# Patient Record
Sex: FEMALE | Race: WHITE
[De-identification: names, ages, dates, MRNs, and addresses within clinical notes are randomized per-mention and may not be internally consistent; named-entity substitution may affect disease eponyms.]

---

## 2021-11-10 NOTE — NUR
DISCHARGE INSTRUCTIONS GIVEN TO PATIENT. PATIENT C/O HEADACHE. MEDICATED FOR
HEADACHE WITH IBUPROFEN. LUNCH HERE. CALL LIGHT WITHIN REACH.

## 2021-11-10 NOTE — NUR
PT RESTING IN BED CALL LIGHT WITHIN REACH. I LET HER KNOW IT SHOULD NOT BE
MUCH LONGER BEFORE CRNA COMES TO TALK TO HER.

## 2021-11-10 NOTE — NUR
1300: VS CHECKED. PATIENT TOLERATED LUNCH. ASSISTED PATIENT OOB AND TO
BATHROOM. GAIT STEADY. VOID WITHOUT DIFFICULTY. GAIT STEADY BACK TO ROOM. IV
DC'DC WNL. TIP INTACT. DRESSING APPLIED. PATIENT GETTING DRESSED.
1320: PATIENT DISCHARGED TO HOME VIA WHEELCHAIR WITH FRIEND.

## 2021-11-10 NOTE — OR
[facility]
                                    [address] [place], [place]  [address]
_________________________________________________________________________________________
                                                                 Signed   
 
 
DATE OF OPERATION:
11/10/2021
 
SURGEON:
Sandra Garcia MD
 
PREOPERATIVE DIAGNOSES:
Endometrial polyp, cervical polyp.
 
POSTOPERATIVE DIAGNOSES:
Endometrial polyp, cervical polyp, pending pathology.
 
PROCEDURE:
Hysteroscopy, resection of polyps, removal of cervical polyp.
 
ANESTHESIA:
General, MAC.
 
ESTIMATED BLOOD LOSS:
Minimal.
 
DRAINS:
None.
 
INDICATIONS AND FINDINGS:
The patient is a 76-year-old female, who has developed hirsutism over the last several
months with elevated serum androgens, but during the course of her evaluation, was
also found to have a very thickened endometrium.  Endometrial biopsy in the office was
benign other than showing an endometrial polyp.  She now is being evaluated for this.
At the time of surgery, her exam did reveal a slightly enlarged clitoris.  There was
excessive hair growth on the midline of the abdomen.  The uterus itself was top-normal
size.  It sounded to 9 cm.  There were multiple polyps within the uterus.  There was
also a small cervical polyp. 
 
DESCRIPTION OF PROCEDURE:
The patient was prepped and draped in the dorsal lithotomy position.  A weighted
speculum was placed.  The anterior lip of the cervix was visualized and grasped with a
single-tooth tenaculum.  The cavity was sounded to 9 cm.  The endocervical canal was
then dilated to a #10 dilator.  The MyoSure device was placed.  The cavity was evaluated
and it was felt that the MyoSure Lite would be helpful.  This was introduced and the
multiple polyps were excised.  Following this procedure, the cervical polyp was removed
with biopsy forceps.  There was no evidence of any ongoing bleeding.  The tenaculum was
 
    Electronically Signed By: SANDRA GARCIA MD  11/10/21 2109
_________________________________________________________________________________________
PATIENT NAME:     MARIKA GAIXOLA                        
MEDICAL RECORD #: [id number]            OPERATIVE REPORT              
          ACCT #: [id number]  
DATE OF BIRTH:   [birth year]            REPORT #: [id number]      
PHYSICIAN:        SANDRA GARCIA MD            
PCP:              SHERLY THIBODEAUX MD                
REPORT IS CONFIDENTIAL AND NOT TO BE RELEASED WITHOUT AUTHORIZATION
 
 
                                  [facility]
                                    [address] [place] [address]  [address]
_________________________________________________________________________________________
                                                                 Signed   
 
 
removed and there was no evidence of bleeding from the tenaculum site.  The procedure
was terminated.  All sponge and needle counts were correct.  She tolerated the procedure
well and was taken to the recovery room in good condition.
 
 
 
 
            ________________________________________
            Sandra Garcia MD 
 
 
PJW/MODL
Job #:  [id number]
DD:  11/10/2021 11:17:06
DT:  11/10/2021 11:28:24
 
cc:            Dr. Sherly Thibodeaux
 
 
Copies:                                
~
 
 
 
 
 
 
 
 
 
 
 
 
 
 
 
 
 
 
 
 
 
 
    Electronically Signed By: SANDRA GARCIA MD  11/10/21 2109
_________________________________________________________________________________________
PATIENT NAME:     MARIKA GAXIOLA                        
MEDICAL RECORD #: [id number]            OPERATIVE REPORT              
          ACCT #: [id number]  
DATE OF BIRTH:   [birth year]            REPORT #: [id number]      
PHYSICIAN:        SANDRA GARCIA MD            
PCP:              SHERLY THIBODEAUX MD                
REPORT IS CONFIDENTIAL AND NOT TO BE RELEASED WITHOUT AUTHORIZATION

## 2021-11-10 NOTE — NUR
1150: PATIENT BACK IN DAY SURGERY ROOM FROM PACU. DENIES PAIN. DENIES NAUSEA.
ICE WATER GIVEN TO PATIENT. VS CHECKED. PERIPAD IN PLACE WITH SCANT AMOUNT OF
RED DRAINAGE. IV SITE WNL. SCDs ON. LUNCH ORDERED FOR PATIENT. CALL LIGHT
WITHIN REACH.

## 2021-11-10 NOTE — NUR
11/10/21 1132 Betzy Glynn 1104 PT ARRIVED IN PACU SLEEPY WITH NO C/O'S. 1105 RN NOTED SMALL
CUT ON RIGHT SIDE OF BOTTOM LIP IN MOUTH. 1120 DR AT BEDSIDE TALKING
WITH PT. ALL QUESTIONS ANSWERED. 1130 RESTING. REU.

## 2021-11-11 NOTE — EKG
[facility]
                                    [address] [facility]
                                  [place], [place]  [address]
_________________________________________________________________________________________
                                                                 Signed   
 
 
Sinus bradycardia
Otherwise normal ECG
No previous ECGs available
Confirmed by RAJENDRA LOCKE DO ([id number]) on 11/11/2021 9:25:05 PM
 
 
 
 
 
 
 
 
 
 
 
 
 
 
 
 
 
 
 
 
 
 
 
 
 
 
 
 
 
 
 
 
 
 
 
 
 
 
 
 
 
    Electronically Signed By: RAJENDRA LOCKE DO  11/11/21 2125
_________________________________________________________________________________________
PATIENT NAME:     MARIKA GAXIOLA                        
MEDICAL RECORD #: [id number]                     Electrocardiogram             
          ACCT #: [id number]  
DATE OF BIRTH:   [birth year]                                       
PHYSICIAN:   RAJENDRA LOCKE DO                     REPORT #: [id number]
REPORT IS CONFIDENTIAL AND NOT TO BE RELEASED WITHOUT AUTHORIZATION

## 2021-11-18 NOTE — PATH
[facility]
                                    [address], [place]  [address]
_________________________________________________________________________________________
                                                                 Signed   
 
 
 
SPECIMEN(S): A CERVICAL POLYP
SPECIMEN(S): B ENDOMETRIAL POLYP
 
SPECIMEN SOURCE:
A. CERVICAL POLYP
B. ENDOMETRIAL POLYP
 
CLINICAL HISTORY:
Uterine polyp, cervical polyp
 
FINAL PATHOLOGIC DIAGNOSIS:
A.  Cervix, polypectomy:
-  Benign endocervical polyp; negative for dysplasia.
B.  Endometrium, polypectomy:
-  Endometrial polyp with a small focus of complex hyperplasia with atypia, see 
comment. 
 
COMMENT:
B) An immunohistochemical stain for p53 demonstrates weak patchy positivity in 
the area of concern, and the Ki-67 index is low.  These finding support the 
above interpretation. 
This case was reviewed in consultation with a gynecologic pathologist.
BRP:SL:cml:C2NR
 
MICROSCOPIC EXAMINATION:
Histologic sections of all submitted blocks are examined by light microscopy.  
These findings, together with the gross examination, support the pathologic 
diagnosis. 
 
GROSS DESCRIPTION:
Two specimens are received in two containers, labeled "CC."
A.  The specimen, labeled "CC, A," and designated on the requisition "cervical 
polyp," is received in formalin and consists of one tan-pink polypoid tissue 
fragment with clot material measuring 0.7 x 
0.5 x 0.3 cm.  Polyp is bisected longitudinally and specimen is entirely 
submitted in cassette A1. 
B.  The specimen, labeled "CC, B," and designated on the requisition 
"endometrial polyp," is received in formalin and consists of multiple tan-pink 
soft tissue fragments measuring 4.0 x 2.5 x 0.6 cm 
in aggregate.  Specimen is filtered and entirely submitted in cassettes B1-B2.
AT (under the direct supervision of a pathologist)
 
                                                                                    
_________________________________________________________________________________________
PATIENT NAME:     MARIKA GAXIOLA                        
MEDICAL RECORD #: [id number]            PATHOLOGY                     
          ACCT #: [id number]       ACCESSION #: [id number]     
DATE OF BIRTH:   [birth year]            REPORT #: [id number]       
PHYSICIAN:        GRACIE PATHOLOGY              
PCP:              EFRAIN ESPINAL MD                
REPORT IS CONFIDENTIAL AND NOT TO BE RELEASED WITHOUT AUTHORIZATION
 
 
                                  [facility]
                                    [address] [place], [place]  [address]
_________________________________________________________________________________________
                                                                 Signed   
 
 
The Gross Description was prepared using a voice recognition system. The report 
was reviewed for accuracy; however, sound-alike word errors, addition and/or 
deletions may occur. If there is any 
question about this report, please contact Client Services.
 
ADDITIONAL NOTES:
Immunohistochemical and/or in situ hybridization studies were performed on this 
case with the appropriate positive controls that react as expected.  This test 
was developed and its performance 
characteristics determined by [facility].  It has not been cleared or 
approved by the U.S. Food and Drug Administration.  The FDA has determined that 
such clearance or approval is not 
necessary.  This test is used for clinical purposes.  It should not be regarded 
as investigational or for research.  [facility] is certified under the 
Clinical Laboratory Improvement 
Amendments of 1988 (CLIA) as qualified to perform high complexity clinical 
laboratory testing.  This assay has not been validated for specimens that have 
been decalcified. 
 
PERFORMING LABORATORY:
The technical component was performed by [facility], [address] [place] [id number] (Medical Director: Zara Gann MD; CLIA# [id number]). 
[facility], [address] [address] (CLIA# [id number]). 
 
Diagnostician:  Chris Villa MD
Pathologist
Electronically Signed 11/18/2021
 
 
Copies:                                
~
 
 
 
 
 
 
 
 
 
 
                                                                                    
_________________________________________________________________________________________
PATIENT NAME:     GAXIOLAMARIKA                        
MEDICAL RECORD #: [id number]            PATHOLOGY                     
          ACCT #: [id number]       ACCESSION #: [id number]     
DATE OF BIRTH:   [birth year]            REPORT #: [id number]       
PHYSICIAN:        GRACIE HOLLOWAY              
PCP:              EFRAIN ESPINAL MD                
REPORT IS CONFIDENTIAL AND NOT TO BE RELEASED WITHOUT AUTHORIZATION

## 2021-12-15 NOTE — NUR
PT ALERT, ORIENTED AND SITTNG ON SIDE OF BED. PT FEELS INFORMED ALL QUESTIONS
ASKED ANSWERED. DR SUAREZ IN, GAVE BLESSING AND WILL FOLLOW AS NEEDED

## 2021-12-15 NOTE — NUR
PATIENT REPORTS PAIN IMPROVED TO 4/10 ON PAIN SCALE, ADMINISTERED 2ND TAB OF
PERCOCET. REMOVED ALBERTS CATHETER. PATIENT STATES " THIS HURTS, BUT IT'S BETTER
THAN IT WAS". CALL LIGHT WITHIN REACH. NO OTHER NEEDS AT THIS TIME.

## 2021-12-15 NOTE — NUR
PATIENT APPEARS TO BE RESTING WITH EYES CLOSED, APPEARS CALM. PATIENT REPORTS
PAIN CONTROLLED 2/10 ON PAIN SCALE. DRESSING TO SURGICAL LAP SITES C/D/I.
PATIENT REPORTS MAY BE ABLE TO GET UP AND VOID SOON. CALL LIGHT WITHIN REACH
NO OTHER NEEDS AT THIS TIME.

## 2021-12-15 NOTE — NUR
PROVIDED PATIENT WITH DISCHARGE INSTRUCTION, ANSWERED QUESTIONS AND CONCERNS.
THEN PROVIDED PATIENT WITH WHEELCHAIR RIDE OUT TO CAR. PATIENT TRANSFERED
WELL. REMINDED PATIENT PERSCRIPTION IS IN FOLDER.

## 2021-12-15 NOTE — NUR
12/15/21 1016 Ana Laura Malhotra
1008-PATIENT ARRIVED TO PACU ON 6L MASK RR EVEN. PATIENT REACTIVE TO
VERBAL STIMULI EYES OPEN. 3 LAP SITES TO ABDOMEN CDI ALBERTS CATHETER
DRAINING TO GRAVITY YELLOW URINE.
 
1010-PATIENT SHAKES HEAD NO TO PAIN OR NAUSEA. PATIENT REMAINS VERY
DROWSY FALLS BACK ASLEEP. ENCOURAGED TO TAKE DEEP BREATHES.

## 2021-12-15 NOTE — NUR
PATIENT BACK TO DAYSURGERY, BEDSIDE REPORT FROM MICHELLE MEHTA. PATIENT GRUNTING,
REPORTS LOWER ABDOMEN PAIN. PACU NURSE REPORTS THIS IS AS AWAKE AS THE PATIENT
HAS BEEN SINCE COMING FROM OR. PATIENT TOLERATED APPLESAUCE WELL, ADMINISTERED
1 TAB PERCOCET. WILL HOLD SECOND TAB SECONDARY TO PATIENT NOT SURE HOW SHE
RESPONDS TO NARCOTICS, AND REASSES PAIN FOR EFFECTIVENESS.
DRESSING TO PUNCTURE SITES C/D/I. PATIENT GRIMACES HOLDING STOMACH. VSS. CALL
LIGHT WTIHIN REACH.

## 2021-12-15 NOTE — NUR
PATIENT UP TO BATHROOM REPORTS PAIN LESS THAN 2/10 ON PAIN SCALE. APPEARS
STEADY ON FEET. VOIDED 250 ML OF URINE, BLOOD TINGED. BLADDER SCAN  ML.
PATIENT REPORTS SHE FEELS GOOD ABOUT GOING HOME. CALL TO DR. SUAREZ, WHO
VERBALIZED PATIENT SHOULD START PLAVIX TOMORROW, AND OKAYED FOR PATIENT TO GO
HOME.

## 2021-12-16 NOTE — OR
[facility]
                                    [address] [place], [place]  [address]
_________________________________________________________________________________________
                                                                 Signed   
 
 
DATE OF OPERATION:
12/15/2021
 
SURGEON:
Sandra Garcia MD
 
PREOPERATIVE DIAGNOSIS:
Complex atypical hyperplasia (EIN).
 
POSTOPERATIVE DIAGNOSIS:
Complex atypical hyperplasia (EIN), pending pathology.
 
PROCEDURES:
1. Total laparoscopic hysterectomy with bilateral salpingo-oophorectomy.
2. Cystoscopy.
 
ANESTHESIA:
General ET.
 
ESTIMATED BLOOD LOSS:
50 mL.
 
DRAINS:
Davis catheter.
 
ASSISTANT:
Gerald Downing M.D.
 
INDICATIONS AND FINDINGS:
The patient is a 76-year-old female, who has developed increasing hirsutism over the
last six months.  An evaluation did reveal elevated male hormone levels.  CT scan,
however, was negative.  Ultrasound of her uterus was also normal except for a very
thickened endometrium.  She had an endometrial biopsy which revealed a polyp.  
She subsequently underwent hysteroscopy with resection of multiple polyps which returned
as complex atypical hyperplasia which is a precancerous lesion.  She was counseled
and the decision made to proceed with definitive treatment with hysterectomy and removal
of tubes and ovaries.  At the time of surgery, exam under anesthesia revealed a top
normal sized uterus.  The tubes showed evidence of prior tubal ligation.  Her ovaries
were normal. 
 
DESCRIPTION OF PROCEDURE:
The patient was prepped and draped in the dorsal lithotomy position.  A weighted
 
    Electronically Signed By: SANDRA GARCIA MD  12/16/21 1739
_________________________________________________________________________________________
PATIENT NAME:     MARIKA GAXIOLA                        
MEDICAL RECORD #: [id number]            OPERATIVE REPORT              
          ACCT #: [id number]  
DATE OF BIRTH:   [birth year]            REPORT #: [id number]      
PHYSICIAN:        SANDRA GARCIA MD            
PCP:              EFRAIN ESPINAL MD                
REPORT IS CONFIDENTIAL AND NOT TO BE RELEASED WITHOUT AUTHORIZATION
 
 
                                  [facility]
                                    [address] [place], [place]  [address]
_________________________________________________________________________________________
                                                                 Signed   
 
 
speculum was placed.  The anterior lip of the cervix was visualized and grasped with a
single-tooth tenaculum.  The cavity was sounded to 11 cm.  The endocervical canal was
then slightly dilated and a VCare cannula inserted and the balloon inflated at the
fundus.  The tenaculum and speculum removed and the cup was fitted over the cervix and a
locking cap fitted into place.  Attention was then directed above.  The infraumbilical
area was injected with 0.5% Marcaine plain.  An incision was made with a knife and each
layer was serially elevated and incised until the fascia was opened and identified.
Stay sutures were placed on the fascia.  The peritoneum was opened bluntly.  The Keating
was placed and tied into place and the balloon inflated.  Placement of the scope
confirmed proper positioning.  CO2 was then introduced into the abdomen.  The secondary
ports were then placed laterally.  These were placed slightly below the level of the
umbilicus and lateral.  Each of these areas was transilluminated, injected with the
Marcaine, incision made with a knife, and trocars placed under direct vision.  The left
port was a 5 mm port.  The right was Veress needle, followed by the expanding port.
Following this, the pelvis was evaluated and the planned procedure appeared appropriate.
 The LigaSure [place] device was then used to serially coagulate and divide the
patient's left infundibulopelvic ligament.  A 0-PDS Endoloop was then placed over the
infundibulopelvic ligament to aid further in hemostasis.  Further dissection was done
down along the mesosalpinx and broad ligament.  A second endoloop was placed slightly
below the first as it appeared that the infundibulopelvic ligament may not have been
completely surrounded by the Endo loop.  Following this, the further dissection was done
and the round ligament was serially coagulated and divided.  Anterior leaf of the
peritoneum was then  allowing for development of the partial bladder flap.  The
posterior peritoneum was taken down as well.  The uterine vessels were skeletonized and
coagulated multiple times and divided.  Further dissection was done both posteriorly and
anteriorly.  Attention was then directed to the patient's right side.  The
infundibulopelvic ligament was again identified and coagulated and divided multiple
times.  Following this, the 0-PDS Endo-loop was placed for hemostasis.  The mesosalpinx
and broad ligament were serially coagulated and divided.  The round ligament was
serially coagulated and divided.  The anterior leaf of the peritoneum was then taken
down further allowing for completion of the bladder flap.  The peritoneum was taken down
posteriorly as well.  The uterine vessels were then coagulated multiple times and
divided.  Further dissection was done both posteriorly and anteriorly and the cup could
be felt at that point.  A little more dissection was done around the patient's left
uterine vessel area.  Following this, it was felt that the specimen could be removed.
The Sonicision device was used to separate the specimen from the vaginal cuff.  This was
begun posteriorly and wrapped around on the left side anteriorly and again posteriorly
and wrapped around the right side anteriorly.  Following this, the specimen was
retrieved vaginally intact.  A glove with a wet lap was placed into the vagina allowing
for recreation of the pneumoperitoneum.  Attention was directed above and the pelvis was
thoroughly evaluated and irrigated.  There was some bleeding points near the left and
the right uterine vessel areas and these were coagulated using the LigaSure device.  The
 
    Electronically Signed By: SANDRA GARCIA MD  12/16/21 1739
_________________________________________________________________________________________
PATIENT NAME:     MARIKA GAXIOLA                        
MEDICAL RECORD #: [id number]            OPERATIVE REPORT              
          ACCT #: [id number]  
DATE OF BIRTH:   [birth year]            REPORT #: [id number]      
PHYSICIAN:        SANDRA GARCIA MD            
PCP:              EFRAIN ESPINAL MD                
REPORT IS CONFIDENTIAL AND NOT TO BE RELEASED WITHOUT AUTHORIZATION
 
 
                                  [facility]
                                    [address] [place], [place]  [address]
_________________________________________________________________________________________
                                                                 Signed   
 
 
remaining bleeding appeared to be from posterior cuff.  The Endostitch was then used to
close the vaginal cuff.  This was begun at the patient's right uterosacral ligament,
taking care to incorporate the vaginal mucosa both posteriorly and anteriorly and
running to the patient's left uterosacral ligament and back to the center.  Following
this, the pelvis was re-evaluated and there was a raw area over the defect in the
peritoneum on the patient's right side.  The LigaSure device was used to coagulate
along the peritoneal edges.  The infundibulopelvic ligament did appear to be hemostatic.
 The pelvis otherwise appeared slightly raw, but otherwise without any active bleeding.
Tisseel was then used, however, to spray over the cuff and the defects in the peritoneum
to further aid in hemostasis.  Following this, the instruments were removed from the
abdomen after allowing as much CO2 as possible to escape.  The fascial incision of the
umbilicus was re-identified and closed with a running suture of 0-Vicryl.  The skin
incisions were closed with subcuticular sutures of 3-0 Vicryl Rapide.  Attention was
directed down below and the vaginal pack was removed.  The Davis was removed and
cystoscopy was done.  She had received IV fluorescein.  A 30-degree scope was used and
the bladder evaluated.  There was moderate metaplasia noted over the trigone.  Both
ureteral orifices were easily identified with free spill of urine bilaterally.  No
fluorescein was seen during this time, however.  There was no 
abnormality within the bladder otherwise.  The bladder was drained and the Davis
catheter replaced.  All sponge and needle counts were correct.  She tolerated the
procedure well and was taken to the recovery room in good condition.
 
 
 
            ________________________________________
            Sandra Garcia MD 
 
 
PJW/MODL
Job #:  [id number]
DD:  12/15/2021 10:31:48
DT:  12/15/2021 15:52:07
 
cc:            MD Gerald Jansen MD
 
 
Copies:  GERALD DOWNING MD
~
 
 
 
    Electronically Signed By: SANDRA GARCIA MD  12/16/21 1739
_________________________________________________________________________________________
PATIENT NAME:     MARIKA GAXIOLA                        
MEDICAL RECORD #: [id number]            OPERATIVE REPORT              
          ACCT #: [id number]  
DATE OF BIRTH:   [birth year]            REPORT #: [id number]      
PHYSICIAN:        SANDRA GARCIA MD            
PCP:              EFRAIN ESPINAL MD                
REPORT IS CONFIDENTIAL AND NOT TO BE RELEASED WITHOUT AUTHORIZATION

## 2021-12-17 NOTE — PATH
[facility]
                                    [address], [place]  [address]
_________________________________________________________________________________________
                                                                 Signed   
 
 
 
SPECIMEN(S): A UTERUS, CERVIX, BILAT TUBES AND OVARIES
 
SPECIMEN SOURCE:
A. UTERUS, CERVIX, BILAT TUBES AND OVARIES
`
 
CLINICAL HISTORY:
Complex endometrial hyperplasia with atypia.  TLH, BSO, cysto.
 
FINAL PATHOLOGIC DIAGNOSIS:
Uterus with bilateral fallopian tubes and ovaries, hysterectomy and bilateral 
salpingo-oophorectomy: 
-  Uterus:
-  Inactive endometrium with cystic atrophy; no residual complex atypical 
hyperplasia identified. 
-  Leiomyoma.
-  Cervix with no significant pathologic changes.
-  Ovaries:
-  Right ovary with a stromal luteoma.
-  Left ovary with an ovarian fibroma.
-  Fallopian tubes:
-  Bilateral fallopian tubes with paratubal cysts.
BRP:cml:C2NR
 
MICROSCOPIC EXAMINATION:
Histologic sections of all submitted blocks are examined by light microscopy.  
These findings, together with the gross examination, support the pathologic 
diagnosis. 
 
GROSS DESCRIPTION:
The specimen, labeled "BoykinMarika," and designated on the requisition 
"cervix; uterus bilateral tubes and bilateral ovaries," is received in formalin 
and consists of 140 gram uterus and cervix 
with bilateral adnexa. The uterus is 4.6 x 4.7 x 9.6 cm (cornu-cornu x 
anterior-posterior x fundus-ectocervix). The serosal surface is pink-tan 
smooth. The anterior surface is inked blue and the 
posterior surface is inked black. The parametrial soft tissue is shaved. The 
ectocervical mucosa is pale pink and smooth. Serial sectioning of the cervix 
fails to demonstrate any gross abnormalities. 
The triangular endometrial cavity is lined by a pink smooth, focally congested 
endometrium that has an average thickness of 0.2 cm. No mass lesions are 
 
                                                                                    
_________________________________________________________________________________________
PATIENT NAME:     MARIKA BOYKIN                        
MEDICAL RECORD #: [id number]            PATHOLOGY                     
          ACCT #: [id number]       ACCESSION #: [id number]     
DATE OF BIRTH:   [birth year]            REPORT #: [id number]       
PHYSICIAN:        GRACIE PATHOLOGY              
PCP:              EFRAIN ESPINAL MD                
REPORT IS CONFIDENTIAL AND NOT TO BE RELEASED WITHOUT AUTHORIZATION
 
 
                                  [facility]
                                    [address], [place]  [address]
_________________________________________________________________________________________
                                                                 Signed   
 
 
grossly identified. Sectioning through the 
uterus reveals an ill-defined endomyometrium junction and the opposite pink, 
well circumscribed intramural nodules that measure 0.7 and 1.6 cm in greatest 
dimension. The endometrium is entirely 
 
submitted for histologic examination.
The left tubo-ovarian complex has two segments of fallopian tubes that measure 
4.0 x 1.2 cm and 2.2 x 0.7 with delicate fimbriae. The serosa is violaceous and 
smooth. Cut sections reveal a 0.4 lumen. 
The 2.7 x 1.8 x 1.6 cm ovary has a white-tan cerebriform external surface. 
Serial sectioning reveals a pink-white, variegated ovarian parenchyma with one 
white tan, well circumscribed rubbery nodule. 
The nodule has a white whorled cut surface.
The right tubo-ovarian complex has two segments of fallopian tube that measure 
3.1 x 0.7 cm and 3.6 x 0.7 cm. The serosa is violaceous and smooth with 
delicate fimbriae and paratubal cysts. Cut 
sections reveal a pinpoint lumen. The 5.1 x 3.7 x 2.4 cm ovary has pink-white 
cerebriform external surface. Sectioning reveals a pink-white variegated 
ovarian parenchyma with one orange-brown solid 
nodule that is 3.7 x 3.1 x 2.6 cm. Representative sections are submitted in 22 
cassettes. 
Cassette summary:
 
(A1) anterior cervix and lower uterine segment (endometrial edge inked green)
(A2) posterior cervix and lower uterine segment (endometrial edge inked green)
(A3) anterior uterine wall
(A4-A6)   anterior endomyometrium
(A7) intramural nodule
(A8-A12)  posterior uterine wall
(A13-A14) posterior endomyometrium
(A15-A17) left  tubo-ovarian complex with nodule
(A18-A22) right  tubo-ovarian complex with nodule.
FB (under the direct supervision of a pathologist)
The Gross Description was prepared using a voice recognition system. The report 
was reviewed for accuracy; however, sound-alike word errors, addition and/or 
deletions may occur. If there is any 
question about this report, please contact Client Services.
 
PERFORMING LABORATORY:
The technical component was performed by [facility], [address] [place] [id number] (Medical Director: Zara Gann MD; CLIA# [id number]). 
Professional interpretation was performed by 
 
                                                                                    
_________________________________________________________________________________________
PATIENT NAME:     MARIKA BOYKIN                        
MEDICAL RECORD #: [id number]            PATHOLOGY                     
          ACCT #: [id number]       ACCESSION #: [id number]     
DATE OF BIRTH:   [birth year]            REPORT #: [id number]       
PHYSICIAN:        GRACIE HOLLOWAY              
PCP:              EFRAIN ESPINAL MD                
REPORT IS CONFIDENTIAL AND NOT TO BE RELEASED WITHOUT AUTHORIZATION
 
 
                                  [facility]
                                    [address] [place] [address]  [address]
_________________________________________________________________________________________
                                                                 Signed   
 
 
[facility], [place] branch, [address] [facility] [address], [address] [address] (CLIA# [id number]). 
 
Diagnostician:  Chris Villa MD
Pathologist
Electronically Signed 12/17/2021
 
 
Copies:                                
~
 
 
 
 
 
 
 
 
 
 
 
 
 
 
 
 
 
 
 
 
 
 
 
 
 
 
 
 
 
 
 
 
 
                                                                                    
_________________________________________________________________________________________
PATIENT NAME:     MARIKA BOYKIN                        
MEDICAL RECORD #: [id number]            PATHOLOGY                     
          ACCT #: [id number]       ACCESSION #: [id number]     
DATE OF BIRTH:   [birth year]            REPORT #: [id number]       
PHYSICIAN:        GRACIE PATHOLOGY              
PCP:              EFRAIN ESPINAL MD                
REPORT IS CONFIDENTIAL AND NOT TO BE RELEASED WITHOUT AUTHORIZATION

## 2021-12-30 NOTE — OR
[facility]
                                    [address] [facility]
                                  [place], [place]  [address]
_________________________________________________________________________________________
                                                                 Draft    
 
 
DATE OF OPERATION:
12/30/2021
 
SURGEON:
Sandra Garcia MD
 
FIRST ASSISTANT:
Jason Sy DO
 
PREOPERATIVE DIAGNOSIS:
Vaginal cuff bleeding after hysterectomy.
 
POSTOPERATIVE DIAGNOSIS:
Vaginal cuff bleeding after hysterectomy.
 
PROCEDURE:
Re-exploration of vaginal cuff.
 
ANESTHESIA:
General LMA.
 
ESTIMATED BLOOD LOSS:
15 mL.
 
DRAINS:
None.
 
INDICATIONS AND FINDINGS:
The patient is a 76-year-old female, who underwent a TLH-BSO for endometrial atypical
hyperplasia 15 days ago, who has been having intermittent vaginal bleeding more than
expected.  She was seen in the emergency room on Schroeder Day for vaginal bleeding and
was found to have a small clot at the cuff.  Her vital signs were otherwise stable as
was her blood count.  She was seen in the office yesterday for her routine postop check
and had only a small amount of pink bleeding and had not had any heavy bleeding since
her Schroeder Day experience; however, today she again called with increasing bleeding
and passage of clots.  Evaluation in the office of Dr. Sy revealed a  pumper at
the left angle.  Ultrasound showed the vaginal cuff hematoma to be unchanged.  It was
felt that the cuff needed to be re-explored and with control of this bleeding.  She was
taken to the operating room and the cuff was re-evaluated.  The vaginal mucosa did
appear to be  and there was a small defect at the left angle.  There was no
obvious arterial bleeding, however, at the time of her evaluation. 
 
 
                                                                                    
_________________________________________________________________________________________
PATIENT NAME:     MARIKA GAXIOLA                        
MEDICAL RECORD #: [id number]            OPERATIVE REPORT              
          ACCT #: [id number]  
DATE OF BIRTH:   [birth year]            REPORT #: [id number]      
PHYSICIAN:        SANDRA GARCIA MD            
PCP:              EFRAIN ESPINAL MD                
REPORT IS CONFIDENTIAL AND NOT TO BE RELEASED WITHOUT AUTHORIZATION
 
 
                                  [facility]
                                    [address] [place] [address]  [address]
_________________________________________________________________________________________
                                                                 Draft    
 
 
DESCRIPTION OF PROCEDURE:
The patient was prepped and draped in the dorsal lithotomy position.  A weighted
speculum was placed and the vaginal cuff was grasped near the left angle with long
Allis'.  There was a small clot at the left angle which was removed.  A figure-of-eight
suture of 0 Vicryl was placed at the left angle.  Following this, serial
[id number] were used to reapproximate the vaginal mucosa both posteriorly and
anteriorly and this was marched along to the right side.  Following this, the vaginal
cuff was observed and there was no evidence of any ongoing bleeding.  She was taken out
of Trendelenburg and again observed and there was no evidence of any ongoing bleeding.
At this point, it was felt that she may be better served by observation overnight
because of the episodes of bleeding.  All sponge and needle counts were correct.  She
tolerated the procedure well and was taken to the recovery room in good condition. 
 
 
 
            ________________________________________
            Sandra Garcia MD 
 
 
PJW/MODL
Job #:  [id number]
DD:  12/30/2021 17:06:12
DT:  12/30/2021 21:17:42
 
cc:            Jason Sy DO
 
 
Copies:  ZAWORSKI,JASON M DO
~
 
 
 
 
 
 
 
 
 
 
 
 
 
 
                                                                                    
_________________________________________________________________________________________
PATIENT NAME:     MARIKA GAXIOLA                        
MEDICAL RECORD #: [id number]            OPERATIVE REPORT              
          ACCT #: [id number]  
DATE OF BIRTH:   [birth year]            REPORT #: [id number]      
PHYSICIAN:        SANDRA GARCIA MD            
PCP:              EFRAIN ESPINAL MD                
REPORT IS CONFIDENTIAL AND NOT TO BE RELEASED WITHOUT AUTHORIZATION

## 2021-12-30 NOTE — NUR
RT COLLECTED RAPID COVID 19 SWAB PER DR REQUEST USING IN HOUSE LAB WITH
NO COMPLICATIONS AT THIS TIME.

## 2021-12-30 NOTE — NUR
12/30/21 1626 Jerilyn Mendoza
1623 PATIENT ARRIVES TO PACU RESTING WITH EYES CLOSED. RESPONDS TO
VERBAL STIMULI. RESP EVEN AND UNLABORED, ROOM AIR SATS >97%.

## 2021-12-30 NOTE — NUR
Pt arrived to L&D from PACU with 18g IV to LAC, pt A&O x 4, no c/o pain or
discomfort, no s/sx of distress noted, admission assessment performed, no
bleeding noted on kal pad, pt assisted to bathroom with SBA, scant amount of
blood noted on toliet paper, pt steady on feet, instructed pt to use call bell
prior to getting out of bed d/t IVF and SCDs.

## 2021-12-31 NOTE — NUR
PT UP TO THE BATHROOM HAS STEADY GAIT, DENIES DIZZINESS. SMALL AMOUNT OF
BLEEDING, SMALL CLOTS NOTED WHEN VOIDING. MORINING ASSESSMENT COMPLETED. LUNG
SOUNDS CLEAR, STRONG PULSES, BRUISING AROUND INCISION SITES NOTED PT STATES
BRUISING IS BETTER THAN YESTERDAY. PT DENIES PAIN. PT ORDERED BREAKFAST AND
UPDATED ON THE PLAN FOR THE DAY. PT DENIES NEEDS AT THIS TIME.

## 2021-12-31 NOTE — NUR
PT IS ALERT, ORIENTED AND GETTING READY TO DC. PT DRESSED, WAITING FOR SON TO
COME FOR RIDE. GAVE BLESSING, PT ACKNOWLEDGED

## 2021-12-31 NOTE — NUR
MORING MEDICATIONS GIVEN, EDUCATION ON SIDE EFFECTS GIVEN TO PT, PT VERBALIZED
UNDERSTANDING. PT IV WAS REMOVED, TIP IN TACT, PT TOLERATED WELL. PT UPDATED
ON DISCHARGE. PLAN FOR PT SON TO PICK PT UP. PT DENIES DIZZINESS OR NEEDS AT
THIS TIME.

## 2021-12-31 NOTE — NUR
PT SON TO ROOM, PT IS DRESSED, BELONGINGS GATHERED, UPDATED ON PLAN FOR
DISCHARGE EDUCATION. PT STATES UNDERSTANDING. PT DENIES DIZZINESS OR NEEDS AT
THIS TIME.

## 2021-12-31 NOTE — NUR
PT DISCHARGE INSTRUCTIONS GIVEN TO PT AND PT'S SON.
PT VERBALIZED UNDERSTANDING OF MEDICATIONS
,
WHEN TO TAKE THE NEXT DOSES. PT VERBALIZED UNDERSTANDING ON WHEN TO CALL
THE DOCTOR. PT WAS GIVEN INFORMATION ON A TLH AND A VAGINAL CUFF. ALL
QUESTIONS AND CONCERNS WERE ANSWERED. NO FURTHER QUESTIONS OR CONCERNS FROM PT
AND SON.

## 2022-11-10 NOTE — EKG
[facility]
                                    [address] [facility]
                                  [place], [place]  [address]
_________________________________________________________________________________________
                                                                 Signed   
 
 
Sinus bradycardia
Otherwise normal ECG
When compared with ECG of 10-NOV-2021 07:53,
No significant change was found
Confirmed by ELIZABETH CARR MD ([id number]) on 11/10/2022 5:06:57 PM
 
 
 
 
 
 
 
 
 
 
 
 
 
 
 
 
 
 
 
 
 
 
 
 
 
 
 
 
 
 
 
 
 
 
 
 
 
 
 
 
    Electronically Signed By: ELIZABETH CARR MD  11/10/22 1707
_________________________________________________________________________________________
PATIENT NAME:     MARIKA GAXIOLA                        
MEDICAL RECORD #: [id number]                     Electrocardiogram             
          ACCT #: [id number]  
DATE OF BIRTH:   [birth year]                                       
PHYSICIAN:   ELIZABETH CARR MD                   REPORT #: [id number]
REPORT IS CONFIDENTIAL AND NOT TO BE RELEASED WITHOUT AUTHORIZATION

## 2023-02-02 ENCOUNTER — HOSPITAL ENCOUNTER (EMERGENCY)
Dept: HOSPITAL 46 - ED | Age: 78
Discharge: HOME | End: 2023-02-02
Payer: MEDICARE

## 2023-02-02 VITALS — BODY MASS INDEX: 33.13 KG/M2 | WEIGHT: 180.01 LBS | HEIGHT: 62 IN

## 2023-02-02 DIAGNOSIS — Z20.822: ICD-10-CM

## 2023-02-02 DIAGNOSIS — Z86.73: ICD-10-CM

## 2023-02-02 DIAGNOSIS — Z88.8: ICD-10-CM

## 2023-02-02 DIAGNOSIS — Z79.899: ICD-10-CM

## 2023-02-02 DIAGNOSIS — I10: ICD-10-CM

## 2023-02-02 DIAGNOSIS — J20.5: Primary | ICD-10-CM

## 2023-02-02 DIAGNOSIS — Z79.02: ICD-10-CM

## 2023-02-02 PROCEDURE — A9270 NON-COVERED ITEM OR SERVICE: HCPCS

## 2023-02-02 PROCEDURE — U0003 INFECTIOUS AGENT DETECTION BY NUCLEIC ACID (DNA OR RNA); SEVERE ACUTE RESPIRATORY SYNDROME CORONAVIRUS 2 (SARS-COV-2) (CORONAVIRUS DISEASE [COVID-19]), AMPLIFIED PROBE TECHNIQUE, MAKING USE OF HIGH THROUGHPUT TECHNOLOGIES AS DESCRIBED BY CMS-2020-01-R: HCPCS

## 2023-02-02 PROCEDURE — C9803 HOPD COVID-19 SPEC COLLECT: HCPCS

## 2023-05-09 VITALS — DIASTOLIC BLOOD PRESSURE: 75 MMHG | SYSTOLIC BLOOD PRESSURE: 117 MMHG

## 2023-05-17 ENCOUNTER — HOSPITAL ENCOUNTER (OUTPATIENT)
Dept: HOSPITAL 46 - OPS | Age: 78
Discharge: HOME | End: 2023-05-17
Attending: SURGERY
Payer: MEDICARE

## 2023-05-17 VITALS — SYSTOLIC BLOOD PRESSURE: 113 MMHG | DIASTOLIC BLOOD PRESSURE: 65 MMHG

## 2023-05-17 VITALS — WEIGHT: 192.4 LBS | HEIGHT: 62 IN | BODY MASS INDEX: 35.41 KG/M2

## 2023-05-17 VITALS — DIASTOLIC BLOOD PRESSURE: 61 MMHG | SYSTOLIC BLOOD PRESSURE: 109 MMHG

## 2023-05-17 DIAGNOSIS — K63.5: ICD-10-CM

## 2023-05-17 DIAGNOSIS — Z12.11: Primary | ICD-10-CM

## 2023-05-17 DIAGNOSIS — E66.9: ICD-10-CM

## 2023-05-17 DIAGNOSIS — Z88.8: ICD-10-CM

## 2023-05-17 DIAGNOSIS — K62.1: ICD-10-CM

## 2023-05-17 DIAGNOSIS — F17.210: ICD-10-CM

## 2023-05-17 DIAGNOSIS — I10: ICD-10-CM

## 2023-05-17 DIAGNOSIS — Z86.010: ICD-10-CM

## 2023-05-17 DIAGNOSIS — Z80.0: ICD-10-CM

## 2023-05-17 DIAGNOSIS — K64.4: ICD-10-CM

## 2023-05-17 DIAGNOSIS — K64.8: ICD-10-CM

## 2023-05-17 DIAGNOSIS — K57.30: ICD-10-CM

## 2023-05-17 DIAGNOSIS — Z79.899: ICD-10-CM

## 2023-05-17 NOTE — OR
[facility]
                                    [address] [place], [place]  [address]
_________________________________________________________________________________________
                                                                 Signed   
 
 
DATE OF OPERATION:
2023
 
SURGEON:
Huey Wallace MD
 
PREOPERATIVE DIAGNOSES:
1. Personal history of colonic polyps in 2015 at age 69.
2. Mother with colon cancer in her 70s.
 
POSTOPERATIVE DIAGNOSES:
1. Moderate circumferential external hemorrhoids.
2. Minimal internal anal skin tags.
3. Moderate sigmoid diverticulosis.
4. 5 mm polyp at 45 cm in left colon.
5. 4 mm polyp at 4 cm in rectum.
 
PROCEDURE:
Colonoscopy with hot biopsy.
 
ESTIMATED BLOOD LOSS:
None.
 
INDICATIONS:
Marika is a 78-year-old obese female, asked to see me for a followup colonoscopy.  She
spoke of possibly three prior colonoscopies.  She thinks the 1st two colonoscopies were
negative.  She had colonic polyps removed during her last colonoscopy around  while
living in [place], [place].  She cannot remember the doctor's name.  She cannot remember
the primary care provider at that time.  However, her mother is known to have colon
cancer in her 70s.  Mother had surgery but did not need chemotherapy nor radiation
therapy.  Her mother finally  at age 93 from dementia.  As expected, Marika comes
every five years for repeat colonoscopies.  She has no lower GI complaints.  In the
office, I gave her a pamphlet on colonoscopy.  We reviewed the nature of the test.
There is risk including, but not limited to gas bloating, crampy abdominal pain,
bleeding, perforation requiring surgery, and missed diagnosis.  We had also reviewed the
written instructions for the bowel prep line by line.  She told me the commercial bowel
preps were quite terrible.  Hopefully, this was much better with Dulcolax, MiraLAX and
Gatorade.  We asked her to hold the Plavix for five days prior to the procedure.  Her
other medications would be fine.  Also because of her large face, heavy chest and
abdomen as well as her previous stroke, we did ask for monitored anesthesia care with
propofol infusion.  That turned out to work very nicely.  She had expressed
understanding and wished to proceed. 
 
    Electronically Signed By: HUEY WALLACE MD  23 [address]
_________________________________________________________________________________________
PATIENT NAME:     MARIKA GAXIOLA                        
MEDICAL RECORD #: [id number]            OPERATIVE REPORT              
          ACCT #: [id number]  
DATE OF BIRTH:   [birth year]            REPORT #: [id number]      
PHYSICIAN:        HUEY WALLACE MD             
PCP:              SHERLY ESPINAL MD                
REPORT IS CONFIDENTIAL AND NOT TO BE RELEASED WITHOUT AUTHORIZATION
 
 
                                  [facility]
                                    [address] [place], [place]  [address]
_________________________________________________________________________________________
                                                                 Signed   
 
 
 
PROCEDURE NOTE:
Marika was taken into our endoscopy suite and placed in the left lateral decubitus
position.  She was given monitored anesthesia care per our nurse anesthetist.  A digital
rectal exam was performed and she does have moderate circumferential external
hemorrhoids.  She had good sphincter tone.  There were no masses.  The adult colonoscope
was introduced and advanced all around into the cecum under direct visualization of the
camera without difficulty.  Her prep was quite excellent.  We could easily see the
appendiceal orifice and the ileocecal valve.  The scope was then slowly withdrawn.  We
took pictures throughout for photodocumentation.  She had two polyps mentioned above,
removed with the help of hot biopsy forceps.  She also has some diverticula in the left
and sigmoid colon.  They are moderate in size, moderate in number and scattered about.
Upon retroflexion of the scope, she does have just minimal internal anal skin tags, very
minimal internal hemorrhoid tissue.  After this, the gas was suctioned out and the
colonoscope removed.  Marika tolerated the procedure quite well. 
 
RECOMMENDATIONS:
I will see Marika back in my office in 7 to 10 days to review her results.  It looks
like she will stay on the five year plan. 
 
 
 
            ________________________________________
            Huey Wallace MD 
 
 
ALB/MODL
Job #:  [id number]
DD:  2023 08:13:45
DT:  2023 08:28:44
 
cc:            MD Sherly Lugo MD
 
 
Copies:  HUEY WALLACE MD
~
 
 
 
 
 
    Electronically Signed By: HUEY WALLACE MD  23 0842
_________________________________________________________________________________________
PATIENT NAME:     MARIKA GAXIOLA                        
MEDICAL RECORD #: [id number]            OPERATIVE REPORT              
          ACCT #: [id number]  
DATE OF BIRTH:   [birth year]            REPORT #: [id number]      
PHYSICIAN:        HUEY WALLACE MD             
PCP:              SHERLY ESPINAL MD                
REPORT IS CONFIDENTIAL AND NOT TO BE RELEASED WITHOUT AUTHORIZATION

## 2023-05-17 NOTE — NUR
PT ALERT, ORIENTED AND RESTING IN DARKENED RM. PT C/O HEADACHE AND RN BHARATI
ASSISTED WITH HELPING PT.  SENSED SHE WAS DEHYDRATED, GOT IV GOING AND PT IS
FEELING MUCH BETTER